# Patient Record
Sex: MALE | Race: WHITE | NOT HISPANIC OR LATINO | Employment: OTHER | URBAN - METROPOLITAN AREA
[De-identification: names, ages, dates, MRNs, and addresses within clinical notes are randomized per-mention and may not be internally consistent; named-entity substitution may affect disease eponyms.]

---

## 2023-04-13 ENCOUNTER — OFFICE VISIT (OUTPATIENT)
Dept: ORTHOPEDIC SURGERY | Facility: CLINIC | Age: 73
End: 2023-04-13
Payer: MEDICARE

## 2023-04-13 VITALS — RESPIRATION RATE: 16 BRPM | BODY MASS INDEX: 25.12 KG/M2 | HEIGHT: 69 IN | WEIGHT: 169.6 LBS

## 2023-04-13 DIAGNOSIS — R52 PAIN: Primary | ICD-10-CM

## 2023-04-13 DIAGNOSIS — M17.12 PRIMARY OSTEOARTHRITIS OF LEFT KNEE: ICD-10-CM

## 2023-04-13 RX ORDER — LIDOCAINE HYDROCHLORIDE 10 MG/ML
5 INJECTION, SOLUTION EPIDURAL; INFILTRATION; INTRACAUDAL; PERINEURAL
Status: COMPLETED | OUTPATIENT
Start: 2023-04-13 | End: 2023-04-13

## 2023-04-13 RX ORDER — LISINOPRIL 20 MG/1
TABLET ORAL
COMMUNITY
Start: 2022-12-22

## 2023-04-13 RX ORDER — SILDENAFIL 25 MG/1
TABLET, FILM COATED ORAL
COMMUNITY
Start: 2022-11-15

## 2023-04-13 RX ORDER — HYDROCODONE BITARTRATE AND ACETAMINOPHEN 5; 325 MG/1; MG/1
1 TABLET ORAL
COMMUNITY
Start: 2023-04-05

## 2023-04-13 RX ORDER — METHYLPREDNISOLONE ACETATE 80 MG/ML
80 INJECTION, SUSPENSION INTRA-ARTICULAR; INTRALESIONAL; INTRAMUSCULAR; SOFT TISSUE
Status: COMPLETED | OUTPATIENT
Start: 2023-04-13 | End: 2023-04-13

## 2023-04-13 RX ADMIN — LIDOCAINE HYDROCHLORIDE 5 ML: 10 INJECTION, SOLUTION EPIDURAL; INFILTRATION; INTRACAUDAL; PERINEURAL at 17:08

## 2023-04-13 RX ADMIN — METHYLPREDNISOLONE ACETATE 80 MG: 80 INJECTION, SUSPENSION INTRA-ARTICULAR; INTRALESIONAL; INTRAMUSCULAR; SOFT TISSUE at 17:08

## 2023-04-13 NOTE — PROGRESS NOTES
Patient: Lui Sibley  YOB: 1950 72 y.o. male  Medical Record Number: [id number]    Chief Complaints:   Chief Complaint   Patient presents with   • Left Knee - Pain       History of Present Illness:Lui Sibley is a 72 y.o. male who presents with complaints of having left knee pain that is chronic in nature.  Patient reports that he initially injured his knee snow skiing several years ago.  He states that his symptoms were worsened around March 1 while in [place].  Patient states that he was in a lot of walking and his knee started hurting 1 day.  He denies any known injury that caused this knee.  Patient states that he has been having severe pain and discomfort ever since.  He states that he is having to use a cane to help get around for stability purposes.  He reports the pain is located to the anterior lateral portion of the knee and describes it as a constant severe ache.  He reports the pain is exacerbated by prolonged walking, standing, going up and down steps; mild relief by taking Advil.  He also reports his knee is very stiff and has difficulty with full extension and flexion.  He denies any signs or symptoms of infection, and he is without any other significant complaints today.    Allergies: No Known Allergies    Medications:   Current Outpatient Medications   Medication Sig Dispense Refill   • HYDROcodone-acetaminophen (NORCO) 5-325 MG per tablet Take 1 tablet by mouth.     • lisinopril (PRINIVIL,ZESTRIL) 20 MG tablet      • sildenafil (VIAGRA) 25 MG tablet Take  by mouth.     • diclofenac sodium (VOTAREN XR) 100 MG 24 hr tablet Take 1 tablet by mouth Daily. 30 tablet 0     No current facility-administered medications for this visit.         The following portions of the patient's history were reviewed and updated as appropriate: allergies, current medications, past family history, past medical history, past social history, past surgical history and problem list.    Review of Systems:  "  Pertinent positives/negatives listed in HPI above    Physical Exam:   Vitals:    04/13/23 1339   Resp: 16   Weight: 76.9 kg (169 lb 9.6 oz)   Height: 175.3 cm (69\")   PainSc:   8       General: A and O x 3, ASA, NAD      Knee Exam List: Knee:  left    ALIGNMENT:     Valgus      GAIT:    Antalgic    SKIN:    No abnormality    RANGE OF MOTION:   5  -  95   DEG    STRENGTH:   4  / 5    LIGAMENTS:    No varus / valgus instability.   Negative  Lachman.    MENISCUS:     Negative   Evelio       DISTAL PULSES:    Paplable    DISTAL SENSATION :   Intact    LYMPHATICS:     No   lymphadenopathy    OTHER:          - Positive   effusion      - Crepitance with ROM      - Tenderness noted to Pez Anserine Bursa      - Tenderness noted to lateral joint line      - Palpable Bakers cyst        Radiology:  Xrays 3views Left Knee (ap,lateral, sunrise) were ordered and reviewed for evaluation of knee pain demonstrating advanced valgus osteoarthritis with bone on bone articulation, subchondral cysts, and periarticular osteophytes.  Patient also has moderate patellofemoral osteoarthritis as well.  No other x-rays were available for comparison purposes.    Assessment/Plan: Primary osteoarthritis left knee    Treatment options as well as imaging results were discussed in detail with the patient.  Patient is likely exhibiting an arthritic flareup of the left knee as he has severe valgus deformity with bone-on-bone articulation.  We are going to proceed forward conservative measures at this time.  I am going to give him a prescription for physical therapy to work on range of motion and strengthening exercises.  I am also going to give him a prescription for Voltaren 100 mg XR to take daily.  We will also give him a prescription for a medial heel wedge.  I will also give him an intra-articular joint injection of cortisone today to see if he can help calm down his inflammation.  I did educate him on rest, ice, compression, and elevation as " well is applying topical NSAID cream to help with the inflammation and swelling.  We will have the patient follow back up with me in 3 months for reevaluation.    Large Joint Arthrocentesis: L knee  Date/Time: 4/13/2023 5:08 PM  Consent given by: patient  Site marked: site marked  Timeout: Immediately prior to procedure a time out was called to verify the correct patient, procedure, equipment, support staff and site/side marked as required   Supporting Documentation  Indications: pain and joint swelling   Procedure Details  Location: knee - L knee  Preparation: Patient was prepped and draped in the usual sterile fashion  Needle size: 22 G  Approach: anterolateral  Medications administered: 80 mg methylPREDNISolone acetate 80 MG/ML; 5 mL lidocaine PF 1% 1 %  Patient tolerance: patient tolerated the procedure well with no immediate complications      3 mL of lidocaine was used for anesthetic purposes        Diagnoses and all orders for this visit:    1. Pain (Primary)  -     XR Knee 3 View Left    2. Primary osteoarthritis of left knee  -     Ambulatory Referral to Physical Therapy Evaluate and treat, Ortho    Other orders  -     diclofenac sodium (VOTAREN XR) 100 MG 24 hr tablet; Take 1 tablet by mouth Daily.  Dispense: 30 tablet; Refill: 0         VON Cantu  4/13/2023

## 2023-05-09 ENCOUNTER — TELEPHONE (OUTPATIENT)
Dept: ORTHOPEDIC SURGERY | Facility: CLINIC | Age: 73
End: 2023-05-09

## 2023-05-09 NOTE — TELEPHONE ENCOUNTER
I just prescribed this medication to this patient last month. He can take this medication AS NEEDED. If he is not in pain, then he doesn't need to take it. If he is having pain then he can take it once a day as needed.

## 2023-05-09 NOTE — TELEPHONE ENCOUNTER
Caller: Lui Sibley    Relationship: Self    Best call back number:     Do you know the name of the person who called:NITZA    What was the call regarding: MEDICATION QUESTION    Do you require a callback: YES

## 2023-05-09 NOTE — TELEPHONE ENCOUNTER
Provider: DARON FERGUSON   Caller: GERRY JOHNSON   Relationship to Patient: PATIENT   Pharmacy:  [facility] [id number] - [place], [place] - [address] [facility] [address] [facility] [address] - [phone] PH - [phone] FX   [address] [facility] [place] [address]   Phone: [phone] Fax: [phone]       Phone Number: [phone]  Reason for Call: diclofenac sodium (VOTAREN XR) 100 MG 24 hr tablet [[id number]]     Order Details  Dose: 100 mg Route: Oral Frequency: Daily   Dispense Quantity: 30 tablet Refills: 0          Sig: Take 1 tablet by mouth Daily.       When was the patient last seen: 04/13/23  PATIENT HAS 3 TABLETS REMAINING AND WANTS TO KNOW IF IT'S SAFE FOR HIM TO GET A REFILL, SHOULD HE? HOW OFTEN SHOULD HE TAKE, SHOULD HE ONLY TAKE WHEN HE HAS PAIN?  IS THERE ANOTHER RX THAT WOULD BE SAFER FOR HIM?  PATIENT DOES NOT WANT TO BE WITHOUT RX FOR PAIN SHOULD HE NEED.  PATIENT SCHEDULED 3 MO FOLLOW UP, WOULD LIKE A PHONE CALL TO DISCUSS RX, THANK YOU